# Patient Record
(demographics unavailable — no encounter records)

---

## 2025-04-21 NOTE — PLAN
[FreeTextEntry1] : Patient is 2 weeks status post appendectomy.  Pathology confirms appendicitis.  Patient has no complaints.  He is finished his antibiotics.  He is eating going to the bathroom without fever.  On exam his incisions are well-healed.  Plan: Patient will follow in as-needed basis.  He can return to work light duty of no lifting more than 40 pounds.  In 2 weeks he can do full duty no restrictions.